# Patient Record
Sex: FEMALE | Race: BLACK OR AFRICAN AMERICAN | NOT HISPANIC OR LATINO | Employment: UNEMPLOYED | URBAN - METROPOLITAN AREA
[De-identification: names, ages, dates, MRNs, and addresses within clinical notes are randomized per-mention and may not be internally consistent; named-entity substitution may affect disease eponyms.]

---

## 2017-02-28 PROBLEM — I10 HTN, GOAL BELOW 140/90: Status: ACTIVE | Noted: 2017-02-28

## 2017-02-28 PROBLEM — M15.9 PRIMARY OSTEOARTHRITIS INVOLVING MULTIPLE JOINTS: Status: ACTIVE | Noted: 2017-02-28

## 2017-02-28 PROBLEM — G89.29 CHRONIC BILATERAL LOW BACK PAIN WITH BILATERAL SCIATICA: Status: ACTIVE | Noted: 2017-02-28

## 2017-02-28 PROBLEM — M54.41 CHRONIC BILATERAL LOW BACK PAIN WITH BILATERAL SCIATICA: Status: ACTIVE | Noted: 2017-02-28

## 2017-02-28 PROBLEM — M62.830 LUMBAR PARASPINAL MUSCLE SPASM: Status: ACTIVE | Noted: 2017-02-28

## 2017-02-28 PROBLEM — M54.42 CHRONIC BILATERAL LOW BACK PAIN WITH BILATERAL SCIATICA: Status: ACTIVE | Noted: 2017-02-28

## 2017-02-28 PROBLEM — M79.2 NEUROPATHIC PAIN: Status: ACTIVE | Noted: 2017-02-28

## 2017-02-28 PROBLEM — E79.0 HYPERURICEMIA: Status: ACTIVE | Noted: 2017-02-28

## 2017-06-27 PROBLEM — Z79.4 TYPE 2 DIABETES MELLITUS WITH DIABETIC NEUROPATHY, WITH LONG-TERM CURRENT USE OF INSULIN: Status: ACTIVE | Noted: 2017-06-27

## 2017-06-27 PROBLEM — E66.9 OBESITY (BMI 30-39.9): Status: ACTIVE | Noted: 2017-06-27

## 2017-06-27 PROBLEM — E11.40 TYPE 2 DIABETES MELLITUS WITH DIABETIC NEUROPATHY, WITH LONG-TERM CURRENT USE OF INSULIN: Status: ACTIVE | Noted: 2017-06-27

## 2017-06-27 PROBLEM — B37.31 VAGINAL YEAST INFECTION: Status: ACTIVE | Noted: 2017-06-27

## 2017-07-11 ENCOUNTER — HOSPITAL ENCOUNTER (EMERGENCY)
Facility: HOSPITAL | Age: 52
Discharge: HOME | End: 2017-07-11
Attending: EMERGENCY MEDICINE
Payer: MEDICAID

## 2017-07-11 VITALS
HEIGHT: 63 IN | RESPIRATION RATE: 18 BRPM | BODY MASS INDEX: 40.75 KG/M2 | SYSTOLIC BLOOD PRESSURE: 128 MMHG | WEIGHT: 230 LBS | TEMPERATURE: 99 F | HEART RATE: 84 BPM | OXYGEN SATURATION: 100 % | DIASTOLIC BLOOD PRESSURE: 78 MMHG

## 2017-07-11 DIAGNOSIS — T07.XXXA MULTIPLE BRUISES: ICD-10-CM

## 2017-07-11 DIAGNOSIS — R60.9 DEPENDENT EDEMA: Primary | ICD-10-CM

## 2017-07-11 DIAGNOSIS — K04.01 PULPITIS: ICD-10-CM

## 2017-07-11 PROCEDURE — 63600175 PHARM REV CODE 636 W HCPCS: Performed by: EMERGENCY MEDICINE

## 2017-07-11 PROCEDURE — 99283 EMERGENCY DEPT VISIT LOW MDM: CPT | Mod: 25

## 2017-07-11 PROCEDURE — 96372 THER/PROPH/DIAG INJ SC/IM: CPT

## 2017-07-11 RX ORDER — LANCETS
EACH MISCELLANEOUS
Refills: 0 | COMMUNITY
Start: 2017-06-25 | End: 2017-09-15 | Stop reason: SDUPTHER

## 2017-07-11 RX ORDER — BLOOD SUGAR DIAGNOSTIC
STRIP MISCELLANEOUS
Refills: 0 | COMMUNITY
Start: 2017-06-25 | End: 2017-09-15 | Stop reason: SDUPTHER

## 2017-07-11 RX ORDER — KETOROLAC TROMETHAMINE 30 MG/ML
10 INJECTION, SOLUTION INTRAMUSCULAR; INTRAVENOUS
Status: COMPLETED | OUTPATIENT
Start: 2017-07-11 | End: 2017-07-11

## 2017-07-11 RX ORDER — CLINDAMYCIN HYDROCHLORIDE 300 MG/1
300 CAPSULE ORAL EVERY 8 HOURS
Qty: 30 CAPSULE | Refills: 0 | Status: SHIPPED | OUTPATIENT
Start: 2017-07-11 | End: 2017-07-21

## 2017-07-11 RX ORDER — NAPROXEN SODIUM 220 MG
TABLET ORAL
Refills: 0 | COMMUNITY
Start: 2017-06-25 | End: 2017-11-09 | Stop reason: ALTCHOICE

## 2017-07-11 RX ADMIN — KETOROLAC TROMETHAMINE 10 MG: 30 INJECTION, SOLUTION INTRAMUSCULAR at 12:07

## 2017-07-11 NOTE — ED TRIAGE NOTES
Patient came in PER personal transport with c/o RIGHT sided leg pain and bruising. Patient also reports swelling to bilateral lower extremities.

## 2017-07-11 NOTE — ED PROVIDER NOTES
"Encounter Date: 7/11/2017    SCRIBE #1 NOTE: I, Luz Maria Fregoso, am scribing for, and in the presence of, Kristine Thacker MD. Other sections scribed: HPI/ROS.       History     Chief Complaint   Patient presents with    Leg Pain     States she has pain on the outer side of her right thigh and also into her back     CC: Leg pain    Pt is a 51 y.o. female w/ HTN, DM type 2, and sciatica and a hx of muscle spasms presenting to the ED c/o R lateral thigh bruising and constant, moderate (4/10) R thigh pain she describes as "stinging." Pt also reports intermittent lower back pain. Pt reports her feet are swollen.    Pt has taken Aleve for pain with minimal relief. Pt reported to the [facility] ED on the 23rd, 24th, and 25th of last month and that her CBG was 700 on the 25th. Pt reports her CBG this morning was 102. Pt has not taken any of her medicines today. Pt reports joint and nerve problems, sciatica, and a history of muscle spasms since 2014. Pt denies trauma to the area, anticoagulant use, or fevers.         The history is provided by the patient.     Review of patient's allergies indicates:   Allergen Reactions    Penicillins     Lisinopril-hydrochlorothiazide Other (See Comments)     Constant Cough     Past Medical History:   Diagnosis Date    HTN, goal below 140/90 2/28/2017    Type 2 diabetes mellitus with diabetic neuropathy, with long-term current use of insulin 6/27/2017     Past Surgical History:   Procedure Laterality Date    HYSTERECTOMY      KNEE LIGAMENT RECONSTRUCTION Left 06/01/2007     Family History   Problem Relation Age of Onset    Hypertension Mother     Diabetes type II Mother     Heart disease Mother     Pacemaker/defibrilator Mother     Heart disease Father     Stroke Father     Hypertension Sister     Diabetes type II Sister     Asthma Daughter      Social History   Substance Use Topics    Smoking status: Never Smoker    Smokeless tobacco: Never Used    Alcohol use No     Review of " Systems   Constitutional: Negative for fever.   HENT: Negative for rhinorrhea.    Eyes: Negative for visual disturbance.   Respiratory: Negative for cough and shortness of breath.    Cardiovascular: Negative for chest pain.   Gastrointestinal: Negative for diarrhea, nausea and vomiting.   Genitourinary: Negative for dysuria and hematuria.   Musculoskeletal: Positive for arthralgias (+) generalized and myalgias (+) R lateral thigh. Negative for neck pain.   Skin: Negative for rash.   Neurological: Negative for headaches.       Physical Exam     Initial Vitals [07/11/17 1028]   BP Pulse Resp Temp SpO2   (!) 154/78 89 18 98.2 °F (36.8 °C) 98 %      MAP       103.33         Physical Exam    Constitutional: She appears well-developed and well-nourished. She is not diaphoretic. No distress.   HENT:   Head: Normocephalic and atraumatic.   Eyes: EOM are normal. Pupils are equal, round, and reactive to light.   Neck: Normal range of motion. Neck supple.   Cardiovascular: Normal rate and regular rhythm.   No murmur heard.  Pulmonary/Chest: Breath sounds normal. No respiratory distress. She has no wheezes.   Abdominal: Soft. Bowel sounds are normal.   Musculoskeletal: She exhibits edema.   2 areas of bruising to the R lateral thigh; tender to palpitation.   Neurological: She is alert and oriented to person, place, and time.   Skin: Skin is warm and dry. No lesion noted.   Non-pitting dependent edema to bilateral ankles and feet.    Psychiatric: She has a normal mood and affect. Thought content normal.         ED Course   Procedures  Labs Reviewed - No data to display          Medical Decision Making:   History:   Old Medical Records: I decided to obtain old medical records.  Initial Assessment:   This is an urgent evaluation of a 51-year-old woman who presented to the emergency department today secondary to a bruise to her right lateral thigh and associated pain to the same area.  Differential Diagnosis:   Differential  diagnoses included ecchymosis, trauma, hematoma, DVT, amongst others.  ED Management:  On physical examination, patient was in no acute distress.  Lung sounds were clear to auscultation bilaterally and heart sounds were within normal limits.  She had 2 areas of bruising to the right lateral thigh which were tender to palpation.  She had nonpitting dependent edema to bilateral ankles and feet.  Pulses were intact.  Skin was well-perfused.  Patient's symptoms are consistent with ecchymosis.  She has been advised to continue symptomatic care for this.  In terms of her bilateral lower extremity edema which she states is chronic, I've advised her to continue wearing compression stockings and keep her feet elevated.  We discussed not eating salty foods.  She has been advised to follow with her primary care physician and has been given return precautions.    Kristine Thacker MD  7:52 AM  7/12/2017              Scribe Attestation:   Scribe #1: I performed the above scribed service and the documentation accurately describes the services I performed. I attest to the accuracy of the note.    Attending Attestation:           Physician Attestation for Scribe:  Physician Attestation Statement for Scribe #1: I, Kristine Thacker MD, reviewed documentation, as scribed by Luz Maria Fregoso in my presence, and it is both accurate and complete.                 ED Course     Clinical Impression:   The primary encounter diagnosis was Dependent edema. Diagnoses of Multiple bruises and Pulpitis were also pertinent to this visit.                           Kristine Thacker MD  07/20/17 0752

## 2017-07-26 ENCOUNTER — LAB VISIT (OUTPATIENT)
Dept: LAB | Facility: HOSPITAL | Age: 52
End: 2017-07-26
Attending: INTERNAL MEDICINE
Payer: MEDICAID

## 2017-07-26 DIAGNOSIS — Z79.4 TYPE 2 DIABETES MELLITUS WITH DIABETIC NEUROPATHY, WITH LONG-TERM CURRENT USE OF INSULIN: ICD-10-CM

## 2017-07-26 DIAGNOSIS — E11.40 TYPE 2 DIABETES MELLITUS WITH DIABETIC NEUROPATHY, WITH LONG-TERM CURRENT USE OF INSULIN: ICD-10-CM

## 2017-07-26 LAB
ALBUMIN SERPL BCP-MCNC: 3.3 G/DL
ALP SERPL-CCNC: 75 U/L
ALT SERPL W/O P-5'-P-CCNC: 26 U/L
ANION GAP SERPL CALC-SCNC: 6 MMOL/L
AST SERPL-CCNC: 20 U/L
BASOPHILS # BLD AUTO: 0.02 K/UL
BASOPHILS NFR BLD: 0.2 %
BILIRUB SERPL-MCNC: 0.6 MG/DL
BUN SERPL-MCNC: 11 MG/DL
CALCIUM SERPL-MCNC: 9.5 MG/DL
CHLORIDE SERPL-SCNC: 108 MMOL/L
CHOLEST/HDLC SERPL: 5.9 {RATIO}
CO2 SERPL-SCNC: 28 MMOL/L
CREAT SERPL-MCNC: 0.8 MG/DL
CREAT UR-MCNC: 168 MG/DL
DIFFERENTIAL METHOD: NORMAL
EOSINOPHIL # BLD AUTO: 0.2 K/UL
EOSINOPHIL NFR BLD: 2.7 %
ERYTHROCYTE [DISTWIDTH] IN BLOOD BY AUTOMATED COUNT: 14.2 %
EST. GFR  (AFRICAN AMERICAN): >60 ML/MIN/1.73 M^2
EST. GFR  (NON AFRICAN AMERICAN): >60 ML/MIN/1.73 M^2
GLUCOSE SERPL-MCNC: 85 MG/DL
HCT VFR BLD AUTO: 38.9 %
HDL/CHOLESTEROL RATIO: 16.8 %
HDLC SERPL-MCNC: 214 MG/DL
HDLC SERPL-MCNC: 36 MG/DL
HGB BLD-MCNC: 12.7 G/DL
LDLC SERPL CALC-MCNC: 149.6 MG/DL
LYMPHOCYTES # BLD AUTO: 2.1 K/UL
LYMPHOCYTES NFR BLD: 24.9 %
MCH RBC QN AUTO: 27.6 PG
MCHC RBC AUTO-ENTMCNC: 32.6 G/DL
MCV RBC AUTO: 85 FL
MICROALBUMIN UR DL<=1MG/L-MCNC: 5 UG/ML
MICROALBUMIN/CREATININE RATIO: 3 UG/MG
MONOCYTES # BLD AUTO: 0.5 K/UL
MONOCYTES NFR BLD: 6 %
NEUTROPHILS # BLD AUTO: 5.7 K/UL
NEUTROPHILS NFR BLD: 66.2 %
NONHDLC SERPL-MCNC: 178 MG/DL
PLATELET # BLD AUTO: 280 K/UL
PMV BLD AUTO: 9.4 FL
POTASSIUM SERPL-SCNC: 4.1 MMOL/L
PROT SERPL-MCNC: 7.6 G/DL
RBC # BLD AUTO: 4.6 M/UL
SODIUM SERPL-SCNC: 142 MMOL/L
TRIGL SERPL-MCNC: 142 MG/DL
TSH SERPL DL<=0.005 MIU/L-ACNC: 3.02 UIU/ML
URATE SERPL-MCNC: 6.3 MG/DL
WBC # BLD AUTO: 8.6 K/UL

## 2017-07-26 PROCEDURE — 80053 COMPREHEN METABOLIC PANEL: CPT

## 2017-07-26 PROCEDURE — 83036 HEMOGLOBIN GLYCOSYLATED A1C: CPT

## 2017-07-26 PROCEDURE — 85025 COMPLETE CBC W/AUTO DIFF WBC: CPT

## 2017-07-26 PROCEDURE — 82570 ASSAY OF URINE CREATININE: CPT

## 2017-07-26 PROCEDURE — 80061 LIPID PANEL: CPT

## 2017-07-26 PROCEDURE — 84550 ASSAY OF BLOOD/URIC ACID: CPT

## 2017-07-26 PROCEDURE — 84443 ASSAY THYROID STIM HORMONE: CPT

## 2017-07-26 PROCEDURE — 36415 COLL VENOUS BLD VENIPUNCTURE: CPT

## 2017-07-27 LAB
ESTIMATED AVG GLUCOSE: 237 MG/DL
HBA1C MFR BLD HPLC: 9.9 %

## 2017-09-15 PROBLEM — E78.01 HYPERLIPIDEMIA TYPE II: Status: ACTIVE | Noted: 2017-09-15

## 2017-11-09 PROBLEM — G47.00 INSOMNIA: Status: ACTIVE | Noted: 2017-11-09

## 2017-12-12 PROBLEM — E11.42 DM TYPE 2 WITH DIABETIC PERIPHERAL NEUROPATHY: Status: ACTIVE | Noted: 2017-12-12
